# Patient Record
Sex: MALE | Race: WHITE
[De-identification: names, ages, dates, MRNs, and addresses within clinical notes are randomized per-mention and may not be internally consistent; named-entity substitution may affect disease eponyms.]

---

## 2023-02-11 ENCOUNTER — HOSPITAL ENCOUNTER (EMERGENCY)
Dept: HOSPITAL 97 - ER | Age: 63
Discharge: HOME | End: 2023-02-11
Payer: SELF-PAY

## 2023-02-11 VITALS — TEMPERATURE: 97.9 F | DIASTOLIC BLOOD PRESSURE: 89 MMHG | SYSTOLIC BLOOD PRESSURE: 185 MMHG

## 2023-02-11 VITALS — OXYGEN SATURATION: 99 %

## 2023-02-11 DIAGNOSIS — R05.9: Primary | ICD-10-CM

## 2023-02-11 DIAGNOSIS — R07.0: ICD-10-CM

## 2023-02-11 DIAGNOSIS — I10: ICD-10-CM

## 2023-02-11 DIAGNOSIS — Z20.822: ICD-10-CM

## 2023-02-11 LAB — SARS-COV-2 RNA RESP QL NAA+PROBE: NEGATIVE

## 2023-02-11 PROCEDURE — 0240U: CPT

## 2023-02-11 PROCEDURE — 96372 THER/PROPH/DIAG INJ SC/IM: CPT

## 2023-02-11 PROCEDURE — 87081 CULTURE SCREEN ONLY: CPT

## 2023-02-11 PROCEDURE — 99284 EMERGENCY DEPT VISIT MOD MDM: CPT

## 2023-02-11 PROCEDURE — 87070 CULTURE OTHR SPECIMN AEROBIC: CPT

## 2023-02-11 PROCEDURE — 71045 X-RAY EXAM CHEST 1 VIEW: CPT

## 2023-02-11 NOTE — XMS REPORT
Continuity of Care Document

                          Created on:2023



Patient:ROSANNE LOPEZ

Sex:Male

:1960

External Reference #:[id number]





Demographics







                          Address                   [address]



                                                    [place] [address]

 

                          Home Phone                [phone]

 

                          Mobile Phone              [phone]

 

                          Email Address             NONE

 

                          Preferred Language        English

 

                          Marital Status            Unknown

 

                          Pentecostal Affiliation     Unknown

 

                          Race                      Unknown

 

                          Ethnic Group              Unknown









Author







                          Organization              [facility]

t

 

                          Address                   [address]



                                                    [place] [address]

 

                          Phone                     [phone]









Support







                Name            Relationship    Address         Phone

 

                TANIA NEWELL EMR             [address]     Unavailable



                                                SATYA CRAWFORD MORGAN R [facility]             [phone]

[phone]



                                                [place] [address] 









Care Team Providers







                    Name                Role                Phone

 

                    JAIRO ESCALANTE           Primary Care Physician Unavailable

 

                    DR KIERRA HERNANDEZ      Attending Clinician Unavailable

 

                    [id number]          Attending Clinician Unavailable

 

                    [id number]           Attending Clinician Unavailable

 

                    DR JAIRO ESCALANTE        Attending Clinician Unavailable

 

                    [id number]          Attending Clinician Unavailable

 

                    [id number]           Attending Clinician Unavailable

 

                    DR KIERRA HERNANDEZ      Admitting Clinician Unavailable

 

                    DR JAIRO ESCALANTE        Admitting Clinician Unavailable









Payers







           Payer Name Policy Type Policy Number Effective Date Expiration Date S

ource

 

           BLUE CROSS BLUE            [id number]                       



           SHIELD - OP                                             

 

           BLUE CROSS BLUE            [id number]                       



           SHIELD - OP                                             







Problems

This patient has no known problems.



Allergies, Adverse Reactions, Alerts







       Allergy Allergy Status Severity Reaction(s) Onset  Inactive Treating Comm

ents 

Source



       Name   Type                        Date   Date   Clinician        

 

       No Known MA     Active UNKNOWN                                    [facility]



       Drug                                                           [place]



       Allergie                                                         [facility]



       s                                                              l



                                                                      Hospita



                                                                      l







Medications

This patient has no known medications.



Procedures

This patient has no known procedures.



Encounters







        Start   End     Encounter Admission Attending Care    Care    Encounter 

Source



        Date/Time Date/Time Type    Type    Clinicians Facility Department ID   

   

 

        2022         Outpatient         KIERRA HERNANDEZ [facility] [id number]

[address]



        11:25:49                         [id number]                 [id number] [place]

o



                                        [id number]                         [facility]

a



                                                                        l



                                                                        Hospita



                                                                        l

 

        2022 Emergency E       JAIRO ESCALANTE [facility]

[address]



        19:35:00 22:49:00                 [id number]         ROOM            [facility]

po



                                        [id number]                         [facility]

a



                                                                        l



                                                                        Hospita



                                                                        l







Results

This patient has no known results.

## 2023-02-11 NOTE — ER
Nurse's Notes                                                                                     

 [facility]                                                                 

Name: Kurt Kramer                                                                               

Age: 62 yrs                                                                                       

Sex: Male                                                                                         

: 1960                                                                                   

MRN: [id number]                                                                                   

Arrival Date: 2023                                                                          

Time: 12:20                                                                                       

Account#: [id number]                                                                            

Bed 18                                                                                            

Private MD:                                                                                       

Diagnosis: Cough                                                                                  

                                                                                                  

Presentation:                                                                                     

                                                                                             

12:38 Chief complaint: Patient states: sore throat and chest congestion that began 5 days     ss  

      ago. Denies fever/ SOB. Coronavirus screen: Client denies travel out of the [place] in the     

      last 14 days. Client presents with at least one sign or symptom that may indicate           

      coronavirus-19. Ebola Screen: Patient denies exposure to infectious person. Patient         

      denies travel to an Ebola-affected area in the 21 days before illness onset. Initial        

      Sepsis Screen: Does the patient meet any 2 criteria? No. Patient's initial sepsis           

      screen is negative. Does the patient have a suspected source of infection? No.              

      Patient's initial sepsis screen is negative. Risk Assessment: Do you want to hurt           

      yourself or someone else? Patient reports no desire to harm self or others. Onset of        

      symptoms was 2023.                                                             

12:38 Method Of Arrival: Ambulatory                                                           ss  

12:38 Acuity: YASMANI 3                                                                           ss  

                                                                                                  

Historical:                                                                                       

- Allergies:                                                                                      

12:41 No Known Allergies;                                                                     ss  

- Home Meds:                                                                                      

12:41 lisinopril-hydrochlorothiazide 20-25 mg oral tab 1 tab once daily for hypertension      ss  

      [Active];                                                                                   

- PMHx:                                                                                           

12:41 Hypertensive disorder;                                                                  ss  

- PSHx:                                                                                           

12:41 None;                                                                                   ss  

                                                                                                  

- Immunization history:: Client reports having NOT received the Covid vaccine.                    

- Social history:: Smoking status: Patient reports use of chewing tobacco.                        

                                                                                                  

                                                                                                  

Screenin:05 [facility] ED Fall Risk Assessment (Adult) History of falling in the last 3 months,       ph  

      including since admission No falls in past 3 months (0 pts) Confusion or Disorientation     

      No (0 pts) Intoxicated or Sedated No (0 pts) Impaired Gait No (0 pts) Mobility Assist       

      Device Used No (0 pt) Altered Elimination No (0 pt) Score/Fall Risk Level 0 - 2 = Low       

      Risk Oriented to surroundings, Maintained a safe environment, Hourly rounding (assess       

      needs \T\ fall precautionary measures) done. Abuse screen: Denies threats or abuse.         

      Denies injuries from another. Nutritional screening: No deficits noted. Tuberculosis        

      screening: No symptoms or risk factors identified.                                          

                                                                                                  

Assessment:                                                                                       

14:04 General: Appears in no apparent distress. comfortable, well groomed, Behavior is calm,  ph  

      cooperative, appropriate for age, Denies fever, chills. Pain: Denies pain. Neuro: Level     

      of Consciousness is awake, alert, obeys commands, Oriented to person, place, time,          

      situation. Cardiovascular: Denies chest pain, shortness of breath, Capillary refill < 3     

      seconds in bilateral fingers Patient's skin is warm and dry. Respiratory: Airway is         

      patent Respiratory effort is even, unlabored, Respiratory pattern is regular,               

      symmetrical, Breath sounds are coarse in mediastinum Denies shortness of breath. GI: No     

      signs and/or symptoms were reported involving the gastrointestinal system. EENT:            

      Reports pain when swallowing. Derm: Skin is healthy with good turgor, Skin is pink,         

      warm \T\ dry.                                                                               

                                                                                                  

Vital Signs:                                                                                      

12:38  / 96; Pulse 96; Resp 19; Temp 98.2(TE); Pulse Ox 99% on R/A; Weight 79.38 kg;    ss  

      Height 6 ft. 0 in. (182.88 cm);                                                             

15:09  / 89; Pulse 87; Resp 18; Temp 97.9; Pulse Ox 99% on R/A;                         ph  

12:38 Body Mass Index 23.73 (79.38 kg, 182.88 cm)                                               

                                                                                                  

ED Course:                                                                                        

12:20 Patient arrived in ED.                                                                  rg4 

12:28 Ronen Jacobs PA is PHCP.                                                              m 

12:28 Seamus Butt MD is Attending Physician.                                             [facility] 

12:41 Triage completed.                                                                       ss  

12:41 Arm band placed on right wrist.                                                         ss  

12:43 Taylor Gomez, RN is Primary Nurse.                                                    ph  

13:22 Strep Sent.                                                                             ph  

13:22 COVID-19/FLU A+B Sent.                                                                  ph  

13:29 Chest Single View XRAY In Process Unspecified.                                          EDMS

14:05 Patient has correct armband on for positive identification. Bed in low position. Call   ph  

      light in reach. Side rails up X 1. Door closed. Noise minimized.                            

14:06 Patient did not have IV access during this emergency room visit.                        ph  

15:09 No provider procedures requiring assistance completed.                                  ph  

                                                                                                  

Administered Medications:                                                                         

13:22 Drug: Xopenex (levalbuterol) (3) 1.25 mg Route: Inhalation;                             ph  

15:10 Follow up: Response: No adverse reaction                                                ph  

15:06 Drug: Lisinopril 20 mg Route: PO;                                                       ph  

15:10 Follow up: Response: No adverse reaction                                                ph  

15:06 Drug: Decadron (dexamethasone) 10 mg Route: IM; Site: right deltoid;                    ph  

15:10 Follow up: Response: No adverse reaction                                                ph  

                                                                                                  

                                                                                                  

Medication:                                                                                       

14:05 VIS not applicable for this client.                                                     ph  

                                                                                                  

Outcome:                                                                                          

14:49 Discharge ordered by MD. harp 

15:09 Discharged to home ambulatory, with family.                                             ph  

15:09 Condition: good                                                                             

15:09 Discharge instructions given to patient, Instructed on discharge instructions, follow       

      up and referral plans. medication usage, Demonstrated understanding of instructions,        

      follow-up care, medications, Prescriptions given X 4.                                       

15:10 Patient left the ED.                                                                    ph  

                                                                                                  

Signatures:                                                                                       

Dispatcher [employer]                           EDMS                                                 

Ronen Jacobs PA PA jmm Smirch, Shelby, RN RN                                                      

Taylro Gomez RN RN ph Garcia, Rubi                                 rg4                                                  

                                                                                                  

**************************************************************************************************

## 2023-02-11 NOTE — EDPHYS
Physician Documentation                                                                           

 [facility]                                                                 

Name: Kurt Kramer                                                                               

Age: 62 yrs                                                                                       

Sex: Male                                                                                         

: 1960                                                                                   

MRN: [id number]                                                                                   

Arrival Date: 2023                                                                          

Time: 12:20                                                                                       

Account#: [id number]                                                                            

Bed 18                                                                                            

Private MD:                                                                                       

ED Physician Seamus Butt                                                                      

HPI:                                                                                              

                                                                                             

12:44 This 62 yrs old Male presents to ER via Ambulatory with complaints of Congestion, Sore  jmm 

      Throat.                                                                                     

12:44 The patient or guardian reports cough. Onset: The symptoms/episode began/occurred       jmm 

      gradually, 3 day(s) ago. Modifying factors: The symptoms are alleviated by nothing. the     

      symptoms are aggravated by nothing. Associated signs and symptoms: Pertinent                

      positives:. Is a 62-year-old male with history of hypertension the presents emerged         

      department complaints of cough, congestion states symptoms are worsened when he is          

      awake. Patient has taken Mucinex without relief. Denies fever. States having some sore      

      throat..                                                                                    

                                                                                                  

Historical:                                                                                       

- Allergies:                                                                                      

12:41 No Known Allergies;                                                                     ss  

- Home Meds:                                                                                      

12:41 lisinopril-hydrochlorothiazide 20-25 mg oral tab 1 tab once daily for hypertension      ss  

      [Active];                                                                                   

- PMHx:                                                                                           

12:41 Hypertensive disorder;                                                                  ss  

- PSHx:                                                                                           

12:41 None;                                                                                   ss  

                                                                                                  

- Immunization history:: Client reports having NOT received the Covid vaccine.                    

- Social history:: Smoking status: Patient reports use of chewing tobacco.                        

                                                                                                  

                                                                                                  

ROS:                                                                                              

12:44 Constitutional: Negative for fever, chills, and weight loss, Cardiovascular: Negative   jmm 

      for chest pain, palpitations, and edema.                                                    

12:44 ENT: Positive for sore throat.                                                              

12:44 Respiratory: Positive for cough.                                                            

12:44 All other systems are negative.                                                             

                                                                                                  

Exam:                                                                                             

12:44 Constitutional:  This is a well developed, well nourished patient who is awake, alert,  jmm 

      and in no acute distress. Head/Face:  atraumatic. Eyes:  EOMI, no conjunctival erythema     

      appreciated ENT:  Moist Mucus Membranes Neck:  Trachea midline, Supple Chest/axilla:        

      Normal chest wall appearance and motion.   Cardiovascular:  Regular rate and rhythm.        

      No edema appreciated Respiratory:  Normal respirations, no respiratory distress             

      appreciated Abdomen/GI:  Non distended Back:  Normal ROM Skin:  General appearance          

      color normal MS/ Extremity:  Moves all extremities, no obvious deformities appreciated,     

      no edema noted to the lower extremities  Neuro:  Awake and alert Psych:  Behavior is        

      normal, Mood is normal, Patient is cooperative and pleasant                                 

                                                                                                  

Vital Signs:                                                                                      

12:38  / 96; Pulse 96; Resp 19; Temp 98.2(TE); Pulse Ox 99% on R/A; Weight 79.38 kg;    ss  

      Height 6 ft. 0 in. (182.88 cm);                                                             

15:09  / 89; Pulse 87; Resp 18; Temp 97.9; Pulse Ox 99% on R/A;                         ph  

12:38 Body Mass Index 23.73 (79.38 kg, 182.88 cm)                                             ss  

                                                                                                  

MDM:                                                                                              

12:44 Patient medically screened.                                                             sylvia 

14:47 Data reviewed: vital signs, nurses notes, lab test result(s), radiologic studies. I     loyd 

      considered the following discharge prescriptions or medication management in the            

      emergency department Medications were administered in the Emergency Department. See         

      MAR. Independent interpretation of the following test(s) in the Emergency Department        

      X-Ray: My interpretation is An infiltrate is not appreciated. Counseling: I had a           

      detailed discussion with the patient and/or guardian regarding: the historical points,      

      exam findings, and any diagnostic results supporting the discharge/admit diagnosis, lab     

      results, the need for outpatient follow up, to return to the emergency department if        

      symptoms worsen or persist or if there are any questions or concerns that arise at home.    

                                                                                                  

                                                                                             

13:01 Order name: COVID-19/FLU A+B; Complete Time: 14:32                                      [facility] 

                                                                                             

13:01 Order name: Strep; Complete Time: 13:59                                                 [facility] 

                                                                                             

13:01 Order name: Chest Single View XRAY; Complete Time: 13:36                                [facility] 

                                                                                             

13:57 Order name: Throat Culture                                                              [facility]

                                                                                                  

Administered Medications:                                                                         

13:22 Drug: Xopenex (levalbuterol) (3) 1.25 mg Route: Inhalation;                             ph  

15:10 Follow up: Response: No adverse reaction                                                ph  

15:06 Drug: Lisinopril 20 mg Route: PO;                                                       ph  

15:10 Follow up: Response: No adverse reaction                                                ph  

15:06 Drug: Decadron (dexamethasone) 10 mg Route: IM; Site: right deltoid;                    ph  

15:10 Follow up: Response: No adverse reaction                                                ph  

                                                                                                  

                                                                                                  

Disposition Summary:                                                                              

23 14:49                                                                                    

Discharge Ordered                                                                                 

      Location: Home                                                                          [facility] 

      Condition: Stable                                                                       [facility] 

      Diagnosis                                                                                   

        - Cough                                                                               jm 

      Followup:                                                                               jm 

        - With: Private Physician                                                                  

        - When: 2 - 3 days                                                                         

        - Reason: Recheck today's complaints, Continuance of care, Re-evaluation by your           

      physician                                                                                   

      Discharge Instructions:                                                                     

        - Discharge Summary Sheet                                                             jm 

        - Cough, Adult                                                                        jm 

      Forms:                                                                                      

        - Medication Reconciliation Form                                                      [facility] 

        - Thank You Letter                                                                    [facility] 

        - Antibiotic Education                                                                jm 

        - Prescription Opioid Use                                                             [facility] 

      Prescriptions:                                                                              

        - Zithromax Z-Anish 250 mg Oral Tablet                                                       

            - take 1 tablet by ORAL route as directed for 5 days Day 1 - take two (2) tablets jmm 

      one time.  Day 2, 3, 4 , 5 take one (1) tablet once daily.; 6 tablet; Refills: 0,           

      Product Selection Permitted                                                                 

        - Medrol (Anish) 4 mg Oral Tablets, Dose Pack                                                

            - take 1 tablet by ORAL route as directed - follow package instructions; 1        jmm 

      packet; Refills: 0, Product Selection Permitted                                             

        - albuterol sulfate 90 mcg/actuation Inhalation HFA aerosol inhaler                        

            - inhale 2 puff by INHALATION route every 6 hours; 1 Pump; Refills: 0, Product    [facility] 

      Selection Permitted                                                                         

        - Lisinopril-Hydrochlorothiazide 20-25 mg Oral Tablet                                      

            - take 1 tablet by ORAL route once daily; 20 tablet; Refills: 0, Product          jm 

      Selection Permitted                                                                         

Signatures:                                                                                       

Dispatcher [employer]                           Seamus Salcido MD MD cha Mickail, Joel, PA PA jmm Smirch, Shelby, KESHA                      RN                                                      

Taylor Gomez RN                      RN   ph                                                   

                                                                                                  

**************************************************************************************************

## 2023-02-11 NOTE — RAD REPORT
EXAM DESCRIPTION:  RAD - Chest Single View - 2/11/2023 1:27 pm

 

CLINICAL HISTORY:  cough, sob

 

COMPARISON:  No comparisons

 

FINDINGS:  Lines: None.

Lungs: No evidence of edema or pneumonia.

Pleural: No significant pleural effusions or pneumothorax.

Cardiac: The heart size is within normal limits.

Mediastinum: Within normal limits.

Bones: No acute fractures.

Other: None

 

IMPRESSION:  No acute cardiopulmonary disease.

## 2024-11-07 ENCOUNTER — HOSPITAL ENCOUNTER (EMERGENCY)
Dept: HOSPITAL 97 - ER | Age: 64
Discharge: HOME | End: 2024-11-07
Payer: SELF-PAY

## 2024-11-07 VITALS — OXYGEN SATURATION: 98 % | SYSTOLIC BLOOD PRESSURE: 185 MMHG | TEMPERATURE: 99.4 F | DIASTOLIC BLOOD PRESSURE: 94 MMHG

## 2024-11-07 DIAGNOSIS — B35.1: Primary | ICD-10-CM

## 2024-11-07 DIAGNOSIS — L03.115: ICD-10-CM

## 2024-11-07 PROCEDURE — 99283 EMERGENCY DEPT VISIT LOW MDM: CPT
